# Patient Record
Sex: MALE | Race: WHITE
[De-identification: names, ages, dates, MRNs, and addresses within clinical notes are randomized per-mention and may not be internally consistent; named-entity substitution may affect disease eponyms.]

---

## 2018-04-24 ENCOUNTER — HOSPITAL ENCOUNTER (OUTPATIENT)
Dept: HOSPITAL 47 - RADECHMAIN | Age: 58
Discharge: HOME | End: 2018-04-24
Payer: COMMERCIAL

## 2018-04-24 DIAGNOSIS — R07.1: ICD-10-CM

## 2018-04-24 DIAGNOSIS — I08.1: Primary | ICD-10-CM

## 2018-04-24 PROCEDURE — 93306 TTE W/DOPPLER COMPLETE: CPT

## 2018-04-24 PROCEDURE — 93017 CV STRESS TEST TRACING ONLY: CPT

## 2018-04-24 NOTE — ECHOF
Referral Reason:R07.1 chest pain, R06.02 shortness of breath



MEASUREMENTS

--------

HEIGHT: 182.9 cm

WEIGHT: 92.5 kg

BP: 

IVSd:   1.3 cm     (0.6 - 1.1)

LVIDd:   4.8 cm     (3.9 - 5.3)

LVPWd:   1.4 cm     (0.6 - 1.1)

IVSs:   1.7 cm

LVIDs:   4.1 cm

LVPWs:   1.6 cm

LA Diam:   3.0 cm     (2.7 - 3.8)

LAESV Index (A-L):   25.50 ml/m

Ao Diam:   3.7 cm     (2.0 - 3.7)

AV Cusp:   1.8 cm     (1.5 - 2.6)

LA Diam:   2.7 cm     (2.7 - 3.8)

MV EXCURSION:   22.213 mm     (> 18.000)

MV EF SLOPE:   102 mm/s     (70 - 150)

EPSS:   0.3 cm

MV E Sandeep:   0.53 m/s

MV DecT:   218 ms

MV A Sandeep:   0.57 m/s

MV E/A Ratio:   0.93 

RAP:   5.00 mmHg

RVSP:   31.76 mmHg







FINDINGS

--------

Sinus rhythm.

This was a technically adequate study.

The left ventricular size is normal.   There is moderate concentric left ventricular hypertrophy.   O
verall left ventricular systolic function is normal with, an EF between 55 - 60 %.

The right ventricle is normal in size.

The left atrial size is normal.

The right atrial size is normal.

The aortic valve is trileaflet, and appears structurally normal. No aortic stenosis or regurgitation.


Mild mitral annular calcification present.   Mild mitral regurgitation is present.

Mild tricuspid regurgitation present.   There is no evidence of pulmonary hypertension.   The right v
entricular systolic pressure, as measured by Doppler, is 31.76mmHg.

There is no pulmonic regurgitation present.

The aortic root size is normal.

There is no pericardial effusion.



CONCLUSIONS

--------

1. The left ventricular size is normal.

2. There is moderate concentric left ventricular hypertrophy.

3. Overall left ventricular systolic function is normal with, an EF between 55 - 60 %.

4. The aortic valve is trileaflet, and appears structurally normal. No aortic stenosis or regurgitati
on.

5. Mild mitral annular calcification present.

6. Mild mitral regurgitation is present.

7. Mild tricuspid regurgitation present.

8. There is no evidence of pulmonary hypertension.

9. The right ventricular systolic pressure, as measured by Doppler, is 31.76mmHg.

10. There is no pulmonic regurgitation present.

11. The aortic root size is normal.

12. There is no pericardial effusion.





SONOGRAPHER: Camryn Maher RDCS

## 2018-04-24 NOTE — EST
EXERCISE STRESS



DATE OF SERVICE:

04/24/2018



AGE:

57



SEX:

Male



HT:

5'10"



WT:

205



PROTOCOL:

Vaibhav



STAGE:

[age]



DURATION OF EXERCISE:

9 minutes



HEART RATE REST:

63



BLOOD PRESSURE REST:

156/100



MAXIMUM HEART RATE ACHIEVED:

141



MAXIMUM BLOOD PRESSURE:

235/95



85% MPHR:

138



100% MPHR:

162



METS:

10



INDICATIONS:

Chest pain.



CLINICAL INFORMATION:

Baseline EKG shows sinus rhythm, normal axis, normal intervals.  The patient exercised

on Vaibhav protocol for a total of 9 minutes achieving 10 METs, 87% of predicted maximal

heart rate without chest pain or diagnostic ST-segment depression.



CONCLUSIONS:

1. Good exercise tolerance.

2. Negative stress test by EKG criteria.





MMODL / IJN: [id number] / Job#: [id number]